# Patient Record
Sex: MALE | Race: WHITE
[De-identification: names, ages, dates, MRNs, and addresses within clinical notes are randomized per-mention and may not be internally consistent; named-entity substitution may affect disease eponyms.]

---

## 2017-01-22 NOTE — ER DOCUMENT REPORT
ED Medical Screen (RME)





- General


Chief Complaint: Skin Problem


Stated Complaint: POSSIBLE INFECTION


Mode of Arrival: Ambulatory


Information source: Patient


Notes: 


35 y/o M presents to ED c/o areas to bilateral forearms of numbness, tingling, 

and redness.  Denies fever or drainage.  Admits to IV drug use last use 2 days 

ago.





I have greeted and performed a rapid initial assessment of this patient. A 

comprehensive ED assessment and evaluation of the patient, analysis of test 

results and completion of the medical decision making process will be conducted 

by additional ED providers.








TRAVEL OUTSIDE OF THE [place] IN LAST 30 DAYS: No





- Related Data


Allergies/Adverse Reactions: 


 





NSAIDS (Non-Steroidal Anti-Inflamma Allergy (Verified 01/22/17 17:02)


 


Penicillins Allergy (Verified 01/22/17 17:02)


 











Past Medical History





- Social History


Frequency of alcohol use: Occasional


Drug Abuse: Methamphetamine


Renal/ Medical History: Denies: Hx Peritoneal Dialysis





Physical Exam





- Vital signs


Vitals: 





 











Temp Pulse Resp BP Pulse Ox


 


 98.3 F   87   20   129/75 H  99 


 


 01/22/17 16:24  01/22/17 16:24  01/22/17 16:24  01/22/17 16:24  01/22/17 16:24














- General


General appearance: Appears well, Alert


In distress: None





Course





- Vital Signs


Vital signs: 





 











Temp Pulse Resp BP Pulse Ox


 


 98.3 F   87   20   129/75 H  99 


 


 01/22/17 16:24  01/22/17 16:24  01/22/17 16:24  01/22/17 16:24  01/22/17 16:24

## 2017-01-22 NOTE — ER DOCUMENT REPORT
ED General





- General


Chief Complaint: Skin Problem


Stated Complaint: POSSIBLE INFECTION


Time seen by provider: 20:42


Mode of Arrival: Ambulatory


Information source: Patient


Notes: 


36-year-old male presents to ED for pain in bilateral forearms and upper arms 

due to multiple IV drug use with meth that was homemade by a friend.  He states 

that he has numbness and tingling in both forearms with redness and swelling to 

areas where he has injected.  Patient states the last IV use was on Thursday.  

He states he used meth frequently last year just got out of half-way a month ago 

and is used multiple times this month the last being on Thursday.


TRAVEL OUTSIDE OF THE [place] IN LAST 30 DAYS: No





- HPI


Onset: Yesterday


Onset/Duration: Gradual


Quality of pain: Sharp, Throbbing, Other - Numbness and tingling


Severity: Moderate


Pain Level: 4


Associated symptoms: Other - Pain to bilateral arms from IV drug use


Exacerbated by: Movement


Relieved by: Denies


Similar symptoms previously: No


Recently seen / treated by doctor: No





- Related Data


Allergies/Adverse Reactions: 


 





NSAIDS (Non-Steroidal Anti-Inflamma Allergy (Verified 01/22/17 17:02)


 


Penicillins Allergy (Verified 01/22/17 17:02)


 











Past Medical History





- General


Information source: Patient





- Social History


Smoking Status: Current Every Day Smoker


Cigarette use (# per day): Yes - one third pack per day


Chew tobacco use (# tins/day): No


Smoking Education Provided: Yes - less than 2 minutes


Frequency of alcohol use: Occasional - The lower sixpack a month


Drug Abuse: Methamphetamine


Lives with: Alone


Family History: Reviewed & Not Pertinent





- Past Medical History


Cardiac Medical History: Reports: None


Pulmonary Medical History: Reports: None


EENT Medical History: Reports: None


Neurological Medical History: Reports: None


Endocrine Medical History: Reports: None


Renal/ Medical History: Reports: None


Malignancy Medical History: Reports None


GI Medical History: Reports: None


Musculoskeltal Medical History: Reports None


Skin Medical History: Reports Hx Cellulitis


Psychiatric Medical History: Reports: None, Other - Drug addiction meth


Traumatic Medical History: Reports: Hx Fractures - Fractured pelvis leg finger 

and skull, Hx Traumatic Brain Injury - Skull fracture


Infectious Medical History: Reports: None


Past Surgical History: Reports: Hx Adenoidectomy, Hx Orthopedic Surgery - 

Cervical fusion tendon repair to pinky finger, Hx Tonsillectomy





Review of Systems





- Review of Systems


Constitutional: No symptoms reported


EENT: No symptoms reported


Cardiovascular: No symptoms reported


Respiratory: No symptoms reported


Gastrointestinal: No symptoms reported


Genitourinary: No symptoms reported


Male Genitourinary: No symptoms reported


Musculoskeletal: No symptoms reported


Skin: Other - Infection and IV drug injection sites states she's having 

numbness and tingling both forearms


Hematologic/Lymphatic: No symptoms reported


Neurological/Psychological: No symptoms reported, Numbness, Tingling


-: Yes All other systems reviewed and negative





Physical Exam





- Vital signs


Vitals: 





 











Temp Pulse Resp BP Pulse Ox


 


 98.3 F   87   20   129/75 H  99 


 


 01/22/17 16:24  01/22/17 16:24  01/22/17 16:24  01/22/17 16:24  01/22/17 16:24











Interpretation: Normal





- General


General appearance: Appears well, Alert





- HEENT


Head: Normocephalic, Atraumatic


Eyes: Normal


Pupils: PERRL





- Respiratory


Respiratory status: No respiratory distress


Chest status: Nontender


Breath sounds: Normal


Chest palpation: Normal





- Cardiovascular


Rhythm: Regular


Heart sounds: Normal auscultation


Murmur: No





- Abdominal


Inspection: Normal


Distension: No distension


Bowel sounds: Normal


Tenderness: Nontender


Organomegaly: No organomegaly





- Back


Back: Normal, Nontender





- Extremities


General upper extremity: Normal inspection, Nontender, Normal color, Normal ROM

, Normal temperature


General lower extremity: Normal inspection, Nontender, Normal color, Normal ROM

, Normal temperature, Normal weight bearing.  No: Reji's sign





- Neurological


Neuro grossly intact: Yes


Cognition: Normal


Orientation: AAOx4


Osbaldo Coma Scale Eye Opening: Spontaneous


Osbaldo Coma Scale Verbal: Oriented


Osbaldo Coma Scale Motor: Obeys Commands


Osbaldo Coma Scale Total: 15


Speech: Normal


Motor strength normal: LUE, RUE, LLE, RLE


Sensory: Normal





- Psychological


Associated symptoms: Normal affect, Normal mood





- Skin


Skin Temperature: Warm


Skin Moisture: Dry


Skin Color: Normal


Location of irregularity: Extremities - Bilateral forearms multiple injection 

sites minimal erythema to sites.  No streaking no warmth to the area.


Character of irregularity: Erythematous - Minimal 2 injection sites


Irregularity with: Tenderness.  negative: Warmth, Inflammation





Course





- Re-evaluation


Re-evalutation: 





01/22/17 20:59


Discussed assessment with Dr. Trotter, recommended patient treated patient with 

Keflex and Septra.  Patient treated with Keflex and Septra in the emergency 

room and discharged home with prescriptions for both.  Patient to follow-up 

with primary doctor.





- Vital Signs


Vital signs: 





 











Temp Pulse Resp BP Pulse Ox


 


 98.3 F   87   20   129/75 H  99 


 


 01/22/17 16:24  01/22/17 16:24  01/22/17 16:24  01/22/17 16:24  01/22/17 16:24














Discharge





- Discharge


Clinical Impression: 


 cellulitis to IV drug injection sites





Condition: Stable


Disposition: HOME, SELF-CARE


Instructions:  Family Physicians / Practices


Additional Instructions: 


CELLULITIS:





     You have an infection of your skin and underlying soft tissues called 

cellulitis.  This is due to bacteria, which can enter through any break in the 

skin, or even through an irritated hair follicle.  Untreated, cellulitis will 

usually worsen.


     Antibiotics are required.  Usually, warm packs or warm soaks, and 

elevation of the infected area are recommended.  You should start getting 

better within 24 to 36 hours.


     Most infections respond quickly to the right medication. Follow-up care is 

important, however, to check for abscess (boil) formation, unsuspected foreign 

body, or resistant infection.


     If you develop fever, chills, or if the area of infection is becoming 

rapidly more swollen or painful, call the doctor at once.





TRIMETHOPRIM-SULFA:


     You have been given a prescription for trimethoprim-sulfa (TMS, Septra, 

Bactrim).  This is a combination antibiotic of the sulfa class, often used for 

urinary tract infections, middle ear infections, bronchitis, shigella 

intestinal infection, and Pneumocystis pneumonia.


     TMS is usually well-tolerated.  Occasional side effects include nausea and 

decreased appetite.  Septra is not recommended for infants less than two months 

of age.  Do not take this medication if you have experienced severe side 

effects or allergy to sulfa medicine.


     You should stop this medicine at once and contact your physician if you 

develop any rash, joint pain, shortness of breath, bruising, or jaundice (

yellow color in the skin), or if you develop any other new or unusual symptoms.





Cephalexin





     The antibiotic you've been prescribed is a member of the cephalosporin 

class.  This type of antibiotic covers a wide variety of infections, including 

those of the skin, lungs, and urinary tract. It's useful for staph infections.


     This antibiotic is slightly similar to the penicillin family. In rare cases

, a person who is allergic to penicillin will also be allergic to this 

medication.  If you have had a severe allergic reaction to penicillin, and have 

not taken this antibiotic since that time, notify your doctor.


     Antibiotics which cover many germs ("broad spectrum" antibiotics) are more 

likely to cause diarrhea or "yeast" infections.  Women prone to vaginal yeast 

problems may suffer an attack after taking this antibiotic.  In infants, oral 

thrush (white spots "stuck" on the cheek) or yeast diaper rash may result.  See 

your doctor if these problems occur.  Call at once if you develop itching, hives

, shortness of breath, or lightheadedness.





FOLLOW-UP CARE:


If you have been referred to a physician for follow-up care, call the physician

s office for an appointment as you were instructed or within the next two days.

  If you experience worsening or a significant change in your symptoms, notify 

the physician immediately or return to the Emergency Department at any time for 

re-evaluation.


Prescriptions: 


Cephalexin Monohydrate [Keflex 500 mg Capsule] 500 mg PO QID #40 capsule


Sulfamethoxazole/Trimethoprim [Septra-Ds 800-160 mg Tablet] 1 tab PO BID #20 

tablet


Forms:  Elevated Blood Pressure, Smoking Cessation Education

## 2017-01-24 NOTE — ER DOCUMENT REPORT
HPI





- HPI


Patient complains to provider of: cellulitis


Pain Level: 5


Context: 


Patient is a 36-year-old male presents emergency Department complaining of 

cellulitis bilateral forearms.  Patient was seen here 2 days ago discharged 

home on Septra and Keflex.  Patient states that his cellulitis.  Present is 

concerned that he has infection affecting his tendons.  Otherwise he is been 

compliant with antibiotics





Denies any past medical history


Denies any past surgical history


Social history: Admits to IV drug use was using for about 10 days of meth and 

last use was last Thursday.


Does not have a primary care provider





- DERM


Skin Color: Normal





Past Medical History





- General


Information source: Patient





- Social History


Smoking Status: Current Every Day Smoker


Chew tobacco use (# tins/day): No


Frequency of alcohol use: Occasional


Drug Abuse: Methamphetamine


Family History: Reviewed & Not Pertinent


Patient has suicidal ideation: No


Patient has homicidal ideation: No


Renal/ Medical History: Denies: Hx Peritoneal Dialysis


Skin Medical History: Reports Hx Cellulitis


Traumatic Medical History: Reports: Hx Fractures - Fractured pelvis leg finger 

and skull, Hx Traumatic Brain Injury - Skull fracture


Past Surgical History: Reports: Hx Adenoidectomy, Hx Orthopedic Surgery - 

Cervical fusion tendon repair to pinky finger, Hx Tonsillectomy





- Immunizations


Hx Diphtheria, Pertussis, Tetanus Vaccination: Yes - 1/22/17





Vertical Provider Document





- CONSTITUTIONAL


Agree With Documented VS: Yes


Exam Limitations: No Limitations


General Appearance: WD/WN, No Apparent Distress





- INFECTION CONTROL


TRAVEL OUTSIDE OF THE [place] IN LAST 30 DAYS: No





- RESPIRATORY


O2 Sat by Pulse Oximetry: 100





- CARDIOVASCULAR


Pulses: Normal: Radial


Notes: 


Capillary refill less than 2 seconds in upper extremity digits bilaterally





- MUSCULOSKELETAL/EXTREMETIES


Musculoskeletal/Extremeties: MAEW, FROM, No Edema.  negative: Eccymosis


Notes: 


 strength 5 out of 5 bilateral





- NEURO


Level of Consciousness: Awake, Alert, Appropriate





- DERM


Integumentary: Warm, Dry


Adult Front & Back Diagram: 


  __________________________














  __________________________





 1 - Cellulitis without area of erythema





 2 - Cellulitis with evidence of erythema no drainage





 3 - Cellulitis without erythema





Notes: 


All areas tender to palpation





Course





- Re-evaluation


Re-evalutation: 


01/24/17 16:51


All areas of cellulitis were evaluated with ultrasound, does not reveal any 

areas of fluid collection, no indication for I&D at this time.





01/24/17 16:51


Patient is hemodynamically stable, no acute distress and afebrile.  No evidence 

of tachycardia.  Instructed on cellulitis care and to clean his antibiotics as 

prescribed





- Vital Signs


Vital signs: 


 











Temp Pulse Resp BP Pulse Ox


 


 98.1 F   97   16   127/73 H  100 


 


 01/24/17 15:03  01/24/17 15:03  01/24/17 15:03  01/24/17 15:03  01/24/17 15:03














- Laboratory


Result Diagrams: 


 01/24/17 15:15





 01/24/17 15:15


Laboratory results interpreted by me: 


 











  01/24/17





  15:15


 


WBC  13.4 H


 


Absolute Neutrophils  10.0 H














Discharge





- Discharge


Clinical Impression: 


Cellulitis


Qualifiers:


 Site of cellulitis: unspecified site Qualified Code(s): L03.90 - Cellulitis, 

unspecified





Condition: Good


Disposition: HOME, SELF-CARE


Additional Instructions: 


CELLULITIS:





     You have an infection of your skin and underlying soft tissues called 

cellulitis.  This is due to bacteria, which can enter through any break in the 

skin, or even through an irritated hair follicle.  Untreated, cellulitis will 

usually worsen.


     Antibiotics are required.  Usually, warm packs or warm soaks, and 

elevation of the infected area are recommended.  You should start getting 

better within 24 to 36 hours.


     Most infections respond quickly to the right medication. Follow-up care is 

important, however, to check for abscess (boil) formation, unsuspected foreign 

body, or resistant infection.


     If you develop fever, chills, or if the area of infection is becoming 

rapidly more swollen or painful, call the doctor at once.








TRIMETHOPRIM-SULFA:


     You have been given a prescription for trimethoprim-sulfa (TMS, Septra, 

Bactrim).  This is a combination antibiotic of the sulfa class, often used for 

urinary tract infections, middle ear infections, bronchitis, shigella 

intestinal infection, and Pneumocystis pneumonia.


     TMS is usually well-tolerated.  Occasional side effects include nausea and 

decreased appetite.  Septra is not recommended for infants less than two months 

of age.  Do not take this medication if you have experienced severe side 

effects or allergy to sulfa medicine.


     You should stop this medicine at once and contact your physician if you 

develop any rash, joint pain, shortness of breath, bruising, or jaundice (

yellow color in the skin), or if you develop any other new or unusual symptoms.








FOLLOW-UP CARE:


If you have been referred to a physician for follow-up care, call the physician

s office for an appointment as you were instructed or within the next two days.

  If you experience worsening or a significant change in your symptoms, notify 

the physician immediately or return to the Emergency Department at any time for 

re-evaluation.


Forms:  Return to Work


Referrals: 


[facility] [NO LOCAL MD] - Follow up as needed

## 2017-01-24 NOTE — ER DOCUMENT REPORT
ED Medical Screen (RME)





- General


Stated Complaint: POSSIBLE INFECTION


Time seen by provider: 15:00


Mode of Arrival: Ambulatory


Information source: Patient


Notes: 


36-year-old male presents to ED for infection in his arm and IV drug use sites 

where he was injecting shaken bake meth.  Seen 2 days ago and started on Septra 

and Bactrim.  The pain and swelling has gotten much worse and is not able to 

use his hand is much.





I have greeted and performed a rapid initial assessment of this patient.  A 

comprehensive ED assessment and evaluation of the patient, analysis of test 

results and completion of medical decision making process will be conducted by 

an additional ED providers.


TRAVEL OUTSIDE OF THE [place] IN LAST 30 DAYS: No





- Related Data


Allergies/Adverse Reactions: 


 





NSAIDS (Non-Steroidal Anti-Inflamma Allergy (Verified 01/22/17 17:02)


 


Penicillins Allergy (Verified 01/22/17 17:02)


 











Past Medical History


Renal/ Medical History: Denies: Hx Peritoneal Dialysis


Skin Medical History: Reports Hx Cellulitis


Traumatic Medical History: Reports: Hx Fractures - Fractured pelvis leg finger 

and skull, Hx Traumatic Brain Injury - Skull fracture


Past Surgical History: Reports: Hx Adenoidectomy, Hx Orthopedic Surgery - 

Cervical fusion tendon repair to pinky finger, Hx Tonsillectomy





- Immunizations


Hx Diphtheria, Pertussis, Tetanus Vaccination: Yes - 1/22/17

## 2018-01-05 ENCOUNTER — HOSPITAL ENCOUNTER (EMERGENCY)
Dept: HOSPITAL 62 - ER | Age: 38
Discharge: HOME | End: 2018-01-05
Payer: COMMERCIAL

## 2018-01-05 VITALS — DIASTOLIC BLOOD PRESSURE: 77 MMHG | SYSTOLIC BLOOD PRESSURE: 113 MMHG

## 2018-01-05 DIAGNOSIS — Z98.1: ICD-10-CM

## 2018-01-05 DIAGNOSIS — S01.111A: Primary | ICD-10-CM
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
## 2018-01-05 DIAGNOSIS — F17.200: ICD-10-CM
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
## 2018-01-05 PROCEDURE — 0HQ1XZZ REPAIR FACE SKIN, EXTERNAL APPROACH: ICD-10-PCS | Performed by: NURSE PRACTITIONER

## 2018-01-05 PROCEDURE — 99283 EMERGENCY DEPT VISIT LOW MDM: CPT

## 2018-01-05 NOTE — ER DOCUMENT REPORT
HPI





- HPI


Patient complains to provider of: cut right eyebrow


Onset: Just prior to arrival


Pain Level: Denies


Context: 





36 yo male from [facility] was doing a handstand  pushup and hit right eyebrow on the 

ground cutting the skin at 2 pm.  imm curent


Associated Symptoms: None


Exacerbated by: Denies


Relieved by: Denies





- ROS


ROS below otherwise negative: Yes


Systems Reviewed and Negative: Yes All other systems reviewed and negative





Past Medical History





- General


Information source: Patient





- Social History


Smoking Status: Current Every Day Smoker


Frequency of alcohol use: None


Drug Abuse: None


Lives with: Other - custodial now


Family History: Reviewed & Not Pertinent


Renal/ Medical History: Denies: Hx Peritoneal Dialysis


Skin Medical History: Reports Hx Cellulitis


Traumatic Medical History: Reports: Hx Fractures - Fractured pelvis leg finger 

and skull, Hx Traumatic Brain Injury - Skull fracture


Past Surgical History: Reports: Hx Adenoidectomy, Hx Orthopedic Surgery - 

Cervical fusion tendon repair to pinky finger, Hx Tonsillectomy





- Immunizations


Hx Diphtheria, Pertussis, Tetanus Vaccination: Yes - 1/22/17





Vertical Provider Document





- CONSTITUTIONAL


Agree With Documented VS: Yes


Exam Limitations: No Limitations


General Appearance: No Apparent Distress





- INFECTION CONTROL


TRAVEL OUTSIDE OF THE [place] IN LAST 30 DAYS: No





- HEENT


HEENT: Normocephalic


Notes: 





1.5 cm full thickness just above lateral right eyebrow





- NECK


Neck: Supple





- NEURO


Level of Consciousness: Awake, Alert, Appropriate


Motor/Sensory: No Motor Deficit, No Sensory Deficit





- DERM


Integumentary: Laceration





Course





- Re-evaluation


Re-evalutation: 





01/05/18 19:55


Patient wanted Dermabond closure





Procedures





- Laceration/Wound Repair


  ** Right Face


Time completed: 19:54


Wound length (cm): 1.5


Wound's Depth, Shape: Linear


Laceration pre-procedure: Other - antibacterial soap/water scrub, sterile NS 

rinse


Irrigated w/ Saline (mLs): 10


Wound Repaired With: Dermabond





Discharge





- Discharge


Clinical Impression: 


 Rt eyebrow laceration Dermabond closure





Condition: Good


Disposition: HOME, SELF-CARE


Instructions:  Facial Laceration ([facility]), Skin Adhesive Closure ([facility])


Additional Instructions: 


Keep the Dermabond glue closure on for 5 days


If it is not fallen off by 7 days you can peel it off


Return to the emergency room any signs of infection or problems with the wound